# Patient Record
Sex: MALE | Race: WHITE | NOT HISPANIC OR LATINO | URBAN - METROPOLITAN AREA
[De-identification: names, ages, dates, MRNs, and addresses within clinical notes are randomized per-mention and may not be internally consistent; named-entity substitution may affect disease eponyms.]

---

## 2018-09-25 NOTE — PATIENT DISCUSSION
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

## 2018-09-25 NOTE — PATIENT DISCUSSION
PLAN: CE/IOL OS W/I-STENT, NO DIAMOX SECONDARY TO SULFA ALLERGY, GOAL JAYDON, CME PROTOCOL, NO VAN, 1DPO W/O.D IN [place]. WANTS CUSTOM.

## 2018-09-25 NOTE — PATIENT DISCUSSION
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 The patient was advised that there will be a necessary period of adaptation to this new vision and they will miss their unaided reading vision. The patient understands and chooses good uncorrected distance vision with the possibility of a refractive surprise.

## 2018-10-15 NOTE — PATIENT DISCUSSION
abrasion rt cornea possibly due to drop bottle scratching cornea (patient reports).  rec. erythromycin petty--2-3 times per day and vigamox os 3 times per day.  continue glaucoma meds.

## 2018-10-15 NOTE — PATIENT DISCUSSION
PLAN: CE/IOL OS W/I-STENT, NO DIAMOX SECONDARY TO SULFA ALLERGY, GOAL JAYDON, CME PROTOCOL, NO VAN, 1DPO W/O.D IN [place]. WANTS CUSTOM.

## 2018-10-16 NOTE — PATIENT DISCUSSION
Patient came in to [facility] office to have a bscl placed in rt eye due to discomfort.  ( -.25-8.4/13.8 [employer] Night and Day)  rtc 1 day  3:40 pm.

## 2018-10-16 NOTE — PATIENT DISCUSSION
PLAN: CE/IOL OS W/I-STENT, NO DIAMOX SECONDARY TO SULFA ALLERGY, GOAL JAYDON, CME PROTOCOL, NO VAN, 1DPO W/O.D IN [place]. WANTS CUSTOM.

## 2018-10-17 NOTE — PATIENT DISCUSSION
Bandage contact lens removed. 10.17.2018 WLS INSTRUCTED PAT TO CONT.  OINTMENT AT NIGHT OD AND D/C LATANOPROST DROPS FOR NOW CONTINUE TIMOLOL QAM OU AND CONTINUE TRIFLURIDINE OD [id number] WHILE AWAKE COOL COMPRESSES AND PRES FREE ARTIFICIAL TEARS OD QID SAMPLES ISSUED.

## 2018-10-17 NOTE — PATIENT DISCUSSION
Patient came in to [facility] office to have a bscl placed in rt eye due to discomfort.  ( -.25-8.4/13.8 [employer] Night and Day)  rtc 1 day  3:40 pm.

## 2018-10-17 NOTE — PATIENT DISCUSSION
PLAN: CE/IOL OS W/I-STENT, NO DIAMOX SECONDARY TO SULFA ALLERGY, GOAL JAYDON, CME PROTOCOL, NO VAN, 1DPO W/O.D IN [place]. WANTS CUSTOM.

## 2018-10-18 NOTE — PATIENT DISCUSSION
Resolving hsv keratitis.  dilated in office today.  continue viroptic [id number] per day.  rtc with Dr. Anca Mcarthur in one week.

## 2018-10-18 NOTE — PATIENT DISCUSSION
Bandage contact lens removed. 10.17.2018 WLS INSTRUCTED PAT TO CONT.  OINTMENT AT NIGHT OD AND D/C LATANOPROST DROPS FOR NOW CONTINUE TIMOLOL QAM OU AND CONTINUE TRIFLURIDINE OD [id number] WHILE AWAKE COOL COMPRESSES AND PRES FREE ARTIFICIAL TEARS OD QID SAMPLES ISSUED.

## 2018-10-18 NOTE — PATIENT DISCUSSION
PLAN: CE/IOL OS W/I-STENT, NO DIAMOX SECONDARY TO SULFA ALLERGY, GOAL JAYDON, CME PROTOCOL, NO VAN, 1DPO W/O.D IN [place]. WANTS CUSTOM.

## 2018-10-25 NOTE — PATIENT DISCUSSION
Abrasion is now gone, Still slight infection there will taper triflurdine starting at 5 x a day for 2 days, 3x a day for 2 days, 1x a day for 2 days then stop, Continue Glaucoma drops as directed.

## 2018-10-25 NOTE — PATIENT DISCUSSION
PLAN: CE/IOL OS W/I-STENT, NO DIAMOX SECONDARY TO SULFA ALLERGY, GOAL JAYDON, CME PROTOCOL, NO VAN, 1DPO W/O.D IN [place]. WANTS CUSTOM.

## 2018-10-25 NOTE — PATIENT DISCUSSION
Bandage contact lens removed. 10.17.2018 WLS INSTRUCTED PAT TO CONT.  OINTMENT AT NIGHT OD AND D/C LATANOPROST DROPS FOR NOW CONTINUE TIMOLOL QAM OU AND CONTINUE TRIFLURIDINE OD [id number] WHILE AWAKE COOL COMPRESSES AND PRES FREE ARTIFICIAL TEARS OD QID SAMPLES ISSUED.

## 2018-11-15 NOTE — PATIENT DISCUSSION
need to get drops and pill instructions from [facility] team then we will have her schedule and send drops and pills through the [place].

## 2018-11-15 NOTE — PATIENT DISCUSSION
Bandage contact lens removed. 10.17.2018 WLS INSTRUCTED PAT TO CONT.  OINTMENT AT NIGHT OD AND D/C LATANOPROST DROPS FOR NOW CONTINUE TIMOLOL QAM OU AND CONTINUE TRIFLURIDINE OD [id number] WHILE AWAKE COOL COMPRESSES AND PRES FREE ARTIFICIAL TEARS OD QID SAMPLES ISSUED.

## 2018-11-15 NOTE — PATIENT DISCUSSION
Patient is cleared to proceed with surgery as per [facility] Patient wishes to switch from custom to Basic + garry, she is aware that she will have to wear glasses at all focal points after surgery.

## 2019-01-22 NOTE — PATIENT DISCUSSION
Patient is cleared to proceed with surgery as per [address] [facility]. Patient wishes to switch from custom to Basic + garry, she is aware that she will have to wear glasses at all focal points after surgery.

## 2019-01-22 NOTE — PATIENT DISCUSSION
Bandage contact lens removed. 10.17.2018 WLS INSTRUCTED PAT TO CONT.  OINTMENT AT NIGHT OD AND D/C LATANOPROST DROPS FOR NOW CONTINUE TIMOLOL QAM OU AND CONTINUE TRIFLURIDINE OD [id number] WHILE AWAKE COOL COMPRESSES AND PRES FREE ARTIFICIAL TEARS OD QID SAMPLES ISSUED.

## 2019-01-22 NOTE — PATIENT DISCUSSION
need to get drops and pill instructions from [facility] team then we will have her schedule and send drops and pills through the [place].

## 2019-01-22 NOTE — PATIENT DISCUSSION
Bandage contact lens removed. 10.17.2018 WLS INSTRUCTED PAT TO CONT.  OINTMENT AT NIGHT OD AND D/C LATANOPROST DROPS FOR NOW CONTINUE TIMOLOL QAM OU AND CONTINUE TRIFLURIDINE OD [id number] WHILE AWAKE COOL COMPRESSES AND PRES FREE ARTIFICIAL TEARS OD QID SAMPLES ISSUED.

## 2019-01-22 NOTE — PATIENT DISCUSSION
Patient is cleared to proceed with surgery as per [address] [facility]. Patient wishes to switch from custom to Basic + garry, she is aware that she will have to wear glasses at all focal points after surgery.

## 2019-01-23 NOTE — PATIENT DISCUSSION
Bandage contact lens removed. 10.17.2018 WLS INSTRUCTED PAT TO CONT.  OINTMENT AT NIGHT OD AND D/C LATANOPROST DROPS FOR NOW CONTINUE TIMOLOL QAM OU AND CONTINUE TRIFLURIDINE OD [id number] WHILE AWAKE COOL COMPRESSES AND PRES FREE ARTIFICIAL TEARS OD QID SAMPLES ISSUED.

## 2019-01-23 NOTE — PATIENT DISCUSSION
need to get drops and pill instructions from [facility] team then we will have her schedule and send drops and pills through the [place].

## 2019-01-30 NOTE — PATIENT DISCUSSION
need to get drops and pill instructions from [facility] team then we will have her schedule and send drops and pills through the [place].

## 2019-01-30 NOTE — PATIENT DISCUSSION
Bandage contact lens removed. 10.17.2018 WLS INSTRUCTED PAT TO CONT.  OINTMENT AT NIGHT OD AND D/C LATANOPROST DROPS FOR NOW CONTINUE TIMOLOL QAM OU AND CONTINUE TRIFLURIDINE OD [id number] WHILE AWAKE COOL COMPRESSES AND PRES FREE ARTIFICIAL TEARS OD QID SAMPLES ISSUED.

## 2019-02-19 NOTE — PATIENT DISCUSSION
Bandage contact lens removed. 10.17.2018 WLS INSTRUCTED PAT TO CONT.  OINTMENT AT NIGHT OD AND D/C LATANOPROST DROPS FOR NOW CONTINUE TIMOLOL QAM OU AND CONTINUE TRIFLURIDINE OD [id number] WHILE AWAKE COOL COMPRESSES AND PRES FREE ARTIFICIAL TEARS OD QID SAMPLES ISSUED.

## 2019-02-19 NOTE — PATIENT DISCUSSION
need to get drops and pill instructions from [facility] team then we will have her schedule and send drops and pills through the [place].

## 2019-05-20 NOTE — PATIENT DISCUSSION
Bandage contact lens removed. 10.17.2018 WLS INSTRUCTED PAT TO CONT.  OINTMENT AT NIGHT OD AND D/C LATANOPROST DROPS FOR NOW CONTINUE TIMOLOL QAM OU AND CONTINUE TRIFLURIDINE OD [id number] WHILE AWAKE COOL COMPRESSES AND PRES FREE ARTIFICIAL TEARS OD QID SAMPLES ISSUED.

## 2019-06-10 NOTE — PATIENT DISCUSSION
Bandage contact lens removed. 10.17.2018 WLS INSTRUCTED PAT TO CONT.  OINTMENT AT NIGHT OD AND D/C LATANOPROST DROPS FOR NOW CONTINUE TIMOLOL QAM OU AND CONTINUE TRIFLURIDINE OD [id number] WHILE AWAKE COOL COMPRESSES AND PRES FREE ARTIFICIAL TEARS OD QID SAMPLES ISSUED.

## 2019-06-12 ENCOUNTER — NEW PATIENT (OUTPATIENT)
Dept: URBAN - METROPOLITAN AREA CLINIC 35 | Facility: CLINIC | Age: 64
End: 2019-06-12

## 2019-06-12 DIAGNOSIS — D49.2: ICD-10-CM

## 2019-06-12 PROCEDURE — 99202 OFFICE O/P NEW SF 15 MIN: CPT

## 2019-06-12 PROCEDURE — 92285 EXTERNAL OCULAR PHOTOGRAPHY: CPT

## 2019-06-12 PROCEDURE — 67810 INCAL BX EYELID SKN LID MRGN: CPT

## 2019-06-12 ASSESSMENT — VISUAL ACUITY
OS_SC: 20/40-1
OD_SC: 20/30-1

## 2019-11-04 NOTE — PATIENT DISCUSSION
Bandage contact lens removed. 10.17.2018 WLS INSTRUCTED PAT TO CONT.  OINTMENT AT NIGHT OD AND D/C LATANOPROST DROPS FOR NOW CONTINUE TIMOLOL QAM OU AND CONTINUE TRIFLURIDINE OD [id number] WHILE AWAKE COOL COMPRESSES AND PRES FREE ARTIFICIAL TEARS OD QID SAMPLES ISSUED.

## 2019-12-02 NOTE — PATIENT DISCUSSION
Bandage contact lens removed. 10.17.2018 WLS INSTRUCTED PAT TO CONT.  OINTMENT AT NIGHT OD AND D/C LATANOPROST DROPS FOR NOW CONTINUE TIMOLOL QAM OU AND CONTINUE TRIFLURIDINE OD [id number] WHILE AWAKE COOL COMPRESSES AND PRES FREE ARTIFICIAL TEARS OD QID SAMPLES ISSUED.

## 2020-03-03 NOTE — PATIENT DISCUSSION
Bandage contact lens removed. 10.17.2018 WLS INSTRUCTED PAT TO CONT.  OINTMENT AT NIGHT OD AND D/C LATANOPROST DROPS FOR NOW CONTINUE TIMOLOL QAM OU AND CONTINUE TRIFLURIDINE OD [id number] WHILE AWAKE COOL COMPRESSES AND PRES FREE ARTIFICIAL TEARS OD QID SAMPLES ISSUED.

## 2020-05-07 NOTE — PATIENT DISCUSSION
Bandage contact lens removed. 10.17.2018 WLS INSTRUCTED PAT TO CONT.  OINTMENT AT NIGHT OD AND D/C LATANOPROST DROPS FOR NOW CONTINUE TIMOLOL QAM OU AND CONTINUE TRIFLURIDINE OD [id number] WHILE AWAKE COOL COMPRESSES AND PRES FREE ARTIFICIAL TEARS OD QID SAMPLES ISSUED.

## 2020-05-14 NOTE — PATIENT DISCUSSION
Bandage contact lens removed. 10.17.2018 WLS INSTRUCTED PAT TO CONT.  OINTMENT AT NIGHT OD AND D/C LATANOPROST DROPS FOR NOW CONTINUE TIMOLOL QAM OU AND CONTINUE TRIFLURIDINE OD [id number] WHILE AWAKE COOL COMPRESSES AND PRES FREE ARTIFICIAL TEARS OD QID SAMPLES ISSUED.

## 2020-05-21 NOTE — PATIENT DISCUSSION
Bandage contact lens removed. 10.17.2018 WLS INSTRUCTED PAT TO CONT.  OINTMENT AT NIGHT OD AND D/C LATANOPROST DROPS FOR NOW CONTINUE TIMOLOL QAM OU AND CONTINUE TRIFLURIDINE OD [id number] WHILE AWAKE COOL COMPRESSES AND PRES FREE ARTIFICIAL TEARS OD QID SAMPLES ISSUED.

## 2020-06-23 NOTE — PATIENT DISCUSSION
Bandage contact lens removed. 10.17.2018 WLS INSTRUCTED PAT TO CONT.  OINTMENT AT NIGHT OD AND D/C LATANOPROST DROPS FOR NOW CONTINUE TIMOLOL QAM OU AND CONTINUE TRIFLURIDINE OD [id number] WHILE AWAKE COOL COMPRESSES AND PRES FREE ARTIFICIAL TEARS OD QID SAMPLES ISSUED.

## 2020-06-30 NOTE — PATIENT DISCUSSION
Bandage contact lens removed. 10.17.2018 WLS INSTRUCTED PAT TO CONT.  OINTMENT AT NIGHT OD AND D/C LATANOPROST DROPS FOR NOW CONTINUE TIMOLOL QAM OU AND CONTINUE TRIFLURIDINE OD [id number] WHILE AWAKE COOL COMPRESSES AND PRES FREE ARTIFICIAL TEARS OD QID SAMPLES ISSUED.

## 2020-07-01 NOTE — PROCEDURE NOTE: SURGICAL
<p>Prior to commencing surgery patient identification, surgical procedure, site, and side were confirmed by Dr. Erinn Oliva. Following topical proparacaine anesthesia, the patient was positioned at the YAG laser, a contact lens coupled to the cornea with methylcellulose and an axial posterior capsulotomy performed without complication using 3.7 Mj x 23. Excess methylcellulose was washed from the eye, one drop of Alphagan was instilled and the patient returned to the holding area having tolerated the procedure well and without complication. </p><p>MRN:[id number]</p>

## 2020-07-01 NOTE — PATIENT DISCUSSION
Bandage contact lens removed. 10.17.2018 WLS INSTRUCTED PAT TO CONT.  OINTMENT AT NIGHT OD AND D/C LATANOPROST DROPS FOR NOW CONTINUE TIMOLOL QAM OU AND CONTINUE TRIFLURIDINE OD [id number] WHILE AWAKE COOL COMPRESSES AND PRES FREE ARTIFICIAL TEARS OD QID SAMPLES ISSUED.

## 2020-07-01 NOTE — PATIENT DISCUSSION
Bandage contact lens removed. 10.17.2018 WLS INSTRUCTED PAT TO CONT.  OINTMENT AT NIGHT OD AND D/C LATANOPROST DROPS FOR NOW CONTINUE TIMOLOL QAM OU AND CONTINUE TRIFLURIDINE OD [id number] WHILE AWAKE COOL COMPRESSES AND PRES FREE ARTIFICIAL TEARS OD QID SAMPLES ISSUED.

## 2020-07-08 NOTE — PATIENT DISCUSSION
Bandage contact lens removed. 10.17.2018 WLS INSTRUCTED PAT TO CONT.  OINTMENT AT NIGHT OD AND D/C LATANOPROST DROPS FOR NOW CONTINUE TIMOLOL QAM OU AND CONTINUE TRIFLURIDINE OD [id number] WHILE AWAKE COOL COMPRESSES AND PRES FREE ARTIFICIAL TEARS OD QID SAMPLES ISSUED.

## 2020-09-24 NOTE — PATIENT DISCUSSION
Bandage contact lens removed. 10.17.2018 WLS INSTRUCTED PAT TO CONT.  OINTMENT AT NIGHT OD AND D/C LATANOPROST DROPS FOR NOW CONTINUE TIMOLOL QAM OU AND CONTINUE TRIFLURIDINE OD [id number] WHILE AWAKE COOL COMPRESSES AND PRES FREE ARTIFICIAL TEARS OD QID SAMPLES ISSUED.

## 2020-09-28 NOTE — PATIENT DISCUSSION
Bandage contact lens removed. 10.17.2018 WLS INSTRUCTED PAT TO CONT.  OINTMENT AT NIGHT OD AND D/C LATANOPROST DROPS FOR NOW CONTINUE TIMOLOL QAM OU AND CONTINUE TRIFLURIDINE OD [id number] WHILE AWAKE COOL COMPRESSES AND PRES FREE ARTIFICIAL TEARS OD QID SAMPLES ISSUED.

## 2020-12-23 NOTE — PATIENT DISCUSSION
consult with Dr. Gisell Raya. Simplex in rt eye.  Defer surgery left eye until resolved. [Wake up at night to urinate  How many times?  ___] : wakes up to urinate [unfilled] times during the night [Negative] : Heme/Lymph [Nocturia] : nocturia [Loss of interest] : denies loss of interest in sexual activity [Genital bacterial infection] : denies genital bacterial infection [Genital yeast infection] : denies genital yeast infection [Sexually Transmitted Disease (STD)] : denies sexually transmitted disease [Urine Infection (bladder/kidney)] : denies bladder/kidney infections [Pain during urination] : denies pain during urination [Pain at onset of urination] : denies pain during onset of urination [Pain after urination] : denies pain after urination [Blood in urine that you can see] : denies seeing blood in urine [Told you have blood in urine on a urine test] : denies being told that blood was present in a urine test [Discharge from urine canal] : denies discharge from urine canal [History of kidney stones] : denies history of kidney stones [Urine retention] : denies urine retention [Strong urge to urinate] : denies strong urge to urinate [Bladder pressure] : denies bladder pressure [Strain or push to urinate] : denies straining or pushing to urinate [Wait a long time to urinate] : denies waiting a long time to urinate [Slow urine stream] : denies slow urine stream [Interrupted urine stream] : denies interrupted urine stream [Bladder fullness after urinating] : denies bladder fullness after urinating [Increased pain/discomfort with bladder filling] : denies increased pain/discomfort with bladder filling [Bladder problems as child. If yes, describe..] : denies bladder problems as child [Leakage of urine with straining, coughing, laughing] : denies leakage of urine with straining, coughing, and/or laughing [Unaware of when urine is leaking] : aware of when urine is leaking

## 2021-03-18 NOTE — PATIENT DISCUSSION
Bandage contact lens removed. 10.17.2018 WLS INSTRUCTED PAT TO CONT.  OINTMENT AT NIGHT OD AND D/C LATANOPROST DROPS FOR NOW CONTINUE TIMOLOL QAM OU AND CONTINUE TRIFLURIDINE OD [id number] WHILE AWAKE COOL COMPRESSES AND PRES FREE ARTIFICIAL TEARS OD QID SAMPLES ISSUED.

## 2021-05-20 NOTE — PATIENT DISCUSSION
CRYOTHERAPY    What is it?    Use of a very cold liquid, such as liquid nitrogen, to freeze and destroy abnormal skin cells that need to be removed    What should I expect?    Tenderness and redness    A small blister that might grow and fill with dark purple blood. There may be crusting.    More than one treatment may be needed if the lesions do not go away.    How do I care for the treated area?    Gently wash the area with your hands when bathing.    Use a thin layer of Vaselin to help with healing. You may use a Band-Aid.     The area should heal within 7-10 days.    Do not use an antibiotic or Neosporin ointment.     You may take acetaminophen (Tylenol) for pain.     Call your Doctor if you have:    Severe pain    Signs of infection (warmth, redness, cloudy yellow drainage, and or a bad smell)    Questions or concerns    Contact infromation:  [facility] [phone]  [facility] [phone]     REC:  RESTARTING TIMOLOL OU QAM.

## 2021-05-20 NOTE — PATIENT DISCUSSION
Bandage contact lens removed. 10.17.2018 WLS INSTRUCTED PAT TO CONT.  OINTMENT AT NIGHT OD AND D/C LATANOPROST DROPS FOR NOW CONTINUE TIMOLOL QAM OU AND CONTINUE TRIFLURIDINE OD [id number] WHILE AWAKE COOL COMPRESSES AND PRES FREE ARTIFICIAL TEARS OD QID SAMPLES ISSUED.

## 2021-06-15 NOTE — PATIENT DISCUSSION
Bandage contact lens removed. 10.17.2018 WLS INSTRUCTED PAT TO CONT.  OINTMENT AT NIGHT OD AND D/C LATANOPROST DROPS FOR NOW CONTINUE TIMOLOL QAM OU AND CONTINUE TRIFLURIDINE OD [id number] WHILE AWAKE COOL COMPRESSES AND PRES FREE ARTIFICIAL TEARS OD QID SAMPLES ISSUED.

## 2021-07-01 NOTE — PATIENT DISCUSSION
Bandage contact lens removed. 10.17.2018 WLS INSTRUCTED PAT TO CONT.  OINTMENT AT NIGHT OD AND D/C LATANOPROST DROPS FOR NOW CONTINUE TIMOLOL QAM OU AND CONTINUE TRIFLURIDINE OD [id number] WHILE AWAKE COOL COMPRESSES AND PRES FREE ARTIFICIAL TEARS OD QID SAMPLES ISSUED.

## 2021-10-14 NOTE — PATIENT DISCUSSION
Bandage contact lens removed. 10.17.2018 WLS INSTRUCTED PAT TO CONT.  OINTMENT AT NIGHT OD AND D/C LATANOPROST DROPS FOR NOW CONTINUE TIMOLOL QAM OU AND CONTINUE TRIFLURIDINE OD [id number] WHILE AWAKE COOL COMPRESSES AND PRES FREE ARTIFICIAL TEARS OD QID SAMPLES ISSUED.

## 2022-04-12 NOTE — PATIENT DISCUSSION
Bandage contact lens removed. 10.17.2018 WLS INSTRUCTED PAT TO CONT.  OINTMENT AT NIGHT OD AND D/C LATANOPROST DROPS FOR NOW CONTINUE TIMOLOL QAM OU AND CONTINUE TRIFLURIDINE OD [id number] WHILE AWAKE COOL COMPRESSES AND PRES FREE ARTIFICIAL TEARS OD QID SAMPLES ISSUED.

## 2022-04-12 NOTE — PATIENT DISCUSSION
Autologous tears QID have helped but pt wishes to stop due to cost. Stop serum tears and go to [place] tears QID. Can return to restasis if condition exacerbates.

## 2022-08-17 NOTE — PATIENT DISCUSSION
Autologous tears QID have helped but pt wishes to stop due to cost. Stop serum tears and go to [place] tears QID. Can return to restasis if condition exacerbates.

## 2022-08-17 NOTE — PATIENT DISCUSSION
Bandage contact lens removed. 10.17.2018 WLS INSTRUCTED PAT TO CONT.  OINTMENT AT NIGHT OD AND D/C LATANOPROST DROPS FOR NOW CONTINUE TIMOLOL QAM OU AND CONTINUE TRIFLURIDINE OD [id number] WHILE AWAKE COOL COMPRESSES AND PRES FREE ARTIFICIAL TEARS OD QID SAMPLES ISSUED.

## 2023-08-21 NOTE — PATIENT DISCUSSION
Infection is gone and eye is healing nicely. Will take pre cautions with medication for surgery OS. Electrodesiccation Text: The wound bed was treated with electrodesiccation after the biopsy was performed.

## 2023-09-14 NOTE — PATIENT DISCUSSION
Pt has appt with [facility] on 9/18/23. Will discuss at that time.    Recommended artificial tears to use: 1 drop 4x a day in both eyes.